# Patient Record
Sex: FEMALE | Race: WHITE | HISPANIC OR LATINO | Employment: FULL TIME | URBAN - METROPOLITAN AREA
[De-identification: names, ages, dates, MRNs, and addresses within clinical notes are randomized per-mention and may not be internally consistent; named-entity substitution may affect disease eponyms.]

---

## 2021-10-25 ENCOUNTER — TELEPHONE (OUTPATIENT)
Dept: NEUROLOGY | Facility: CLINIC | Age: 26
End: 2021-10-25

## 2022-02-22 ENCOUNTER — TELEPHONE (OUTPATIENT)
Dept: NEUROLOGY | Facility: CLINIC | Age: 27
End: 2022-02-22

## 2022-02-22 NOTE — TELEPHONE ENCOUNTER
Kane Lugo called to confirm the appointment date and time and the address  And she provided an email to sign up for [facility] [place] [place]

## 2022-02-24 ENCOUNTER — TELEPHONE (OUTPATIENT)
Dept: NEUROLOGY | Facility: CLINIC | Age: 27
End: 2022-02-24

## 2022-02-24 NOTE — TELEPHONE ENCOUNTER
THE [facility] to confirm patient's 3/1/2022 @   10 AM appointment and to provide address for new office location of [address] [place] [address] [place],[address], [place]

## 2022-02-28 ENCOUNTER — TELEPHONE (OUTPATIENT)
Dept: NEUROLOGY | Facility: CLINIC | Age: 27
End: 2022-02-28

## 2022-03-01 ENCOUNTER — TELEPHONE (OUTPATIENT)
Dept: NEUROLOGY | Facility: CLINIC | Age: 27
End: 2022-03-01

## 2022-03-01 ENCOUNTER — OFFICE VISIT (OUTPATIENT)
Dept: NEUROLOGY | Facility: CLINIC | Age: 27
End: 2022-03-01
Payer: COMMERCIAL

## 2022-03-01 VITALS
TEMPERATURE: 98.5 F | HEART RATE: 94 BPM | WEIGHT: 163 LBS | DIASTOLIC BLOOD PRESSURE: 84 MMHG | BODY MASS INDEX: 32 KG/M2 | SYSTOLIC BLOOD PRESSURE: 114 MMHG | HEIGHT: 60 IN

## 2022-03-01 DIAGNOSIS — G37.9 DEMYELINATING DISEASE (HCC): Primary | ICD-10-CM

## 2022-03-01 PROCEDURE — 99205 OFFICE O/P NEW HI 60 MIN: CPT | Performed by: PSYCHIATRY & NEUROLOGY

## 2022-03-01 RX ORDER — MONTELUKAST SODIUM 10 MG/1
TABLET ORAL
COMMUNITY
Start: 2022-02-24

## 2022-03-01 RX ORDER — NORGESTIMATE AND ETHINYL ESTRADIOL 0.25-0.035
1 KIT ORAL DAILY
COMMUNITY
Start: 2021-09-20

## 2022-03-01 RX ORDER — IPRATROPIUM BROMIDE 21 UG/1
2 SPRAY, METERED NASAL
COMMUNITY
Start: 2021-11-10 | End: 2022-11-10

## 2022-03-01 RX ORDER — ALBUTEROL SULFATE 2.5 MG/3ML
SOLUTION RESPIRATORY (INHALATION)
COMMUNITY
Start: 2021-11-23

## 2022-03-01 RX ORDER — IBUPROFEN 600 MG/1
600 TABLET ORAL EVERY 6 HOURS PRN
COMMUNITY
Start: 2022-01-29

## 2022-03-01 RX ORDER — CARISOPRODOL 350 MG/1
350 TABLET ORAL AS NEEDED
COMMUNITY
Start: 2021-11-07

## 2022-03-01 RX ORDER — ESCITALOPRAM OXALATE 10 MG/1
10 TABLET ORAL DAILY
COMMUNITY
Start: 2022-02-18

## 2022-03-01 RX ORDER — SUMATRIPTAN 100 MG/1
100 TABLET, FILM COATED ORAL AS NEEDED
COMMUNITY
Start: 2021-12-31

## 2022-03-01 RX ORDER — AMITRIPTYLINE HYDROCHLORIDE 50 MG/1
TABLET, FILM COATED ORAL
COMMUNITY
Start: 2022-02-20

## 2022-03-01 RX ORDER — MECLIZINE HCL 12.5 MG/1
12.5 TABLET ORAL AS NEEDED
COMMUNITY
Start: 2022-02-18

## 2022-03-01 NOTE — PROGRESS NOTES
Patient ID: Batsheva Fitzpatrick is a 32 y o  female  Assessment/Plan:    Demyel[facility] disease ([facility] )  This is a 30yo w/ a PMH of migraines and episodic vertigo coming in for initial evaluation for possible demyelinating disease after an abnormal MRI in 10/2021 at [facility]  She follows w/ Dr Anita Simpson at [facility] for her migraines and his last note stated possible suspicion of demyelinating disease  Last dilated eye exam was 11/2021 and was noted to have some astigmatism and uses glasses  She previously followed w/ [facility] and there was possible concern for MS at that time but could not definitively state she had a diagnosis of MS around the age of 15-14yo  At that time, she was initially hospitalized for blurry vision that occurred during a migraine (but her vision is fine when not having migraine)  LP was also done that was negative  Evoked potential testing was done at that time that did not show evidence that supported MS at that time  Work-up:  - previous Lyme AB per patient in 2018 was negative  - 10/16/21 temple MRI: Stable nonspecific deep right frontal, pericallosal, and  periventricular white matter signal abnormality  Differential  considerations include sequela of demyelinating disease, vascular disease, autoimmune disease, sarcoid, post viral encephalomyelitis,  and Lyme disease         Plan   - labs ordered for demyelinating disease: Lyme, sjogren's AB, sarcoid (ACE level), BUN, creatinine level  - C-spine and T-spine with and w/o contrast MRI ordered to rule out any new cord lesions   - expect to get MRI brain w/ and w/o contrast in 6-12 months since last MRI brain was done 10/15/2021  - f/u in 4 months or earlier if worsening symptoms          Problem List Items Addressed This Visit        Nervous and Auditory    Demyelinating disease ([facility] ) - Primary     This is a 30yo w/ a PMH of migraines and episodic vertigo coming in for initial evaluation for possible demyelinating disease after an abnormal MRI in 10/2021 at [facility]  She follows w/ Dr Gabriel Li at [place] for her migraines and his last note stated possible suspicion of demyelinating disease  Last dilated eye exam was 11/2021 and was noted to have some astigmatism and uses glasses  She previously followed w/ [facility] and there was possible concern for MS at that time but could not definitively state she had a diagnosis of MS around the age of 15-12yo  At that time, she was initially hospitalized for blurry vision that occurred during a migraine (but her vision is fine when not having migraine)  LP was also done that was negative  Evoked potential testing was done at that time that did not show evidence that supported MS at that time  Work-up:  - previous Lyme AB per patient in 2018 was negative  - 10/16/21 temple MRI: Stable nonspecific deep right frontal, pericallosal, and  periventricular white matter signal abnormality  Differential  considerations include sequela of demyelinating disease, vascular disease, autoimmune disease, sarcoid, post viral encephalomyelitis,  and Lyme disease  Plan   - labs ordered for demyelinating disease: Lyme, sjogren's AB, sarcoid (ACE level), BUN, creatinine level  - C-spine and T-spine with and w/o contrast MRI ordered to rule out any new cord lesions   - expect to get MRI brain w/ and w/o contrast in 6-12 months since last MRI brain was done 10/15/2021  - f/u in 4 months or earlier if worsening symptoms                   Subjective:    HPI      This is a 32yo w/ a PMH of migraines and episodic vertigo coming in for initial evaluation for possible demyelinating disease after an abnormal MRI in 10/2021 at [facility]  Headaches occured in high school/early college and improved but came back in 07/2020  cervical paraspinal area radiating to neck w/ tightness   Migraines triptan and amitriptyline  will do demyelinating dz w/u    In 2006 (13yo F), when she got MRIs and was referred to neurologist to try and rule-out MS  Was told that "everyone has MS that is dormant " She had a spinal tap, and eye exams  She was referred by Yancy Diallo is [facility] patient  She had seen a neurologist in 2013 and was told it was severe migraine  No history of head trauma; There is a family history of migraines, alzheimers, no seizures, aneurysm, stroke ; She has time periods where the migraines are really bad  For a couple of months, she has the same migraine 3-4 times per week; It's severity seems to be intermittent and triggers include are stress induced  She follows w/ Dr Hafsa Gregory for migraines and was told about white matter disease as possible consideration  The imitrex and amitripyline has helped  She also has episodic vertigo and average lasts around 1-2 weeks  She stated that it is worsened when sitting still but can get bad with movement  Meclizine helped w/ vertigo  She comes into visit to hopefully rule out MS for some reassurance  Any recent falls, trips, bowel/bladder, vision changes or eye pain, no abnormal changes in hot water/unthoff phenomenon, no noted lhermitte signs in the past        10/16/21 temple MRI: Stable nonspecific deep right frontal, pericallosal, and periventricular white matter signal abnormality  Differential considerations include sequela of demyelinating disease, vascular disease, autoimmune disease, sarcoid, post viral encephalomyelitis, and Lyme disease  The following portions of the patient's history were reviewed and updated as appropriate:   She  has no past medical history on file  She   Patient Active Problem List    Diagnosis Date Noted    [facility] 03/01/2022     She  has no past surgical history on file  Her family history is not on file  She  reports that she has never smoked  She has never used smokeless tobacco  She reports current alcohol use   She reports that she does not use drugs   Current Outpatient Medications   Medication Sig Dispense Refill    albuterol (2 5 mg/3 mL) 0 083 % nebulizer solution INHALE 3ML 3 TIMES DAILY BY NEBULIZATION ROUTE AS NEEDED      amitriptyline (ELAVIL) 50 mg tablet       carisoprodol (SOMA) 350 mg tablet Take 350 mg by mouth as needed      escitalopram (LEXAPRO) 10 mg tablet Take 10 mg by mouth daily      ibuprofen (MOTRIN) 600 mg tablet Take 600 mg by mouth every 6 (six) hours as needed      ipratropium (ATROVENT) 0 03 % nasal spray 2 sprays into each nostril      meclizine (ANTIVERT) 12 5 MG tablet Take 12 5 mg by mouth as needed      montelukast (SINGULAIR) 10 mg tablet       norgestimate-ethinyl estradiol (ORTHO-CYCLEN) 0 25-35 MG-MCG per tablet Take 1 tablet by mouth daily      SUMAtriptan (IMITREX) 100 mg tablet Take 100 mg by mouth as needed       No current facility-administered medications for this visit  Current Outpatient Medications on File Prior to Visit   Medication Sig    albuterol (2 5 mg/3 mL) 0 083 % nebulizer solution INHALE 3ML 3 TIMES DAILY BY NEBULIZATION ROUTE AS NEEDED    amitriptyline (ELAVIL) 50 mg tablet     carisoprodol (SOMA) 350 mg tablet Take 350 mg by mouth as needed    escitalopram (LEXAPRO) 10 mg tablet Take 10 mg by mouth daily    ibuprofen (MOTRIN) 600 mg tablet Take 600 mg by mouth every 6 (six) hours as needed    ipratropium (ATROVENT) 0 03 % nasal spray 2 sprays into each nostril    meclizine (ANTIVERT) 12 5 MG tablet Take 12 5 mg by mouth as needed    montelukast (SINGULAIR) 10 mg tablet     norgestimate-ethinyl estradiol (ORTHO-CYCLEN) 0 25-35 MG-MCG per tablet Take 1 tablet by mouth daily    SUMAtriptan (IMITREX) 100 mg tablet Take 100 mg by mouth as needed     No current facility-administered medications on file prior to visit  She is allergic to apple fruit extract - food allergy, peanut (diagnostic) - food allergy, and sulfamethoxazole-trimethoprim            Objective:    Blood pressure 114/84, pulse 94, temperature 98 5 °F (36 9 °C), height 5' (1 524 m), weight 73 9 kg (163 lb)  Physical Exam  Vitals and nursing note reviewed  HENT:      Head: Normocephalic  Nose: Nose normal       Mouth/Throat:      Mouth: Mucous membranes are moist    Eyes:      General: Lids are normal       Extraocular Movements: Extraocular movements intact  Conjunctiva/sclera: Conjunctivae normal       Pupils: Pupils are equal, round, and reactive to light  Cardiovascular:      Rate and Rhythm: Normal rate and regular rhythm  Pulses: Normal pulses  Heart sounds: No murmur heard  Pulmonary:      Effort: Pulmonary effort is normal       Breath sounds: Normal breath sounds  Abdominal:      General: Abdomen is flat  Bowel sounds are normal  There is no distension  Palpations: Abdomen is soft  Tenderness: There is no abdominal tenderness  Musculoskeletal:      Cervical back: Normal range of motion and neck supple  No rigidity or tenderness  Skin:     General: Skin is warm  Neurological:      Mental Status: She is oriented to person, place, and time  Deep Tendon Reflexes:      Reflex Scores:       Tricep reflexes are 2+ on the right side and 2+ on the left side  Bicep reflexes are 2+ on the right side and 2+ on the left side  Brachioradialis reflexes are 2+ on the right side and 2+ on the left side  Patellar reflexes are 2+ on the right side and 2+ on the left side  Achilles reflexes are 2+ on the right side and 2+ on the left side  Neurological Exam  Mental Status  Awake, alert and oriented to person, place and time  Cranial Nerves  CN II: Visual acuity is normal  Visual fields full to confrontation  CN III, IV, VI: Extraocular movements intact bilaterally  Normal lids and orbits bilaterally  Pupils equal round and reactive to light bilaterally  CN V: Facial sensation is normal   CN VII: Full and symmetric facial movement    CN VIII: Hearing is normal   CN IX, X: Palate elevates symmetrically  Normal gag reflex  CN XI: Shoulder shrug strength is normal   CN XII: Tongue midline without atrophy or fasciculations  Motor  Normal muscle bulk throughout  Sensory  Sensation is intact to light touch, pinprick, vibration and proprioception in all four extremities  Reflexes                                           Right                      Left  Brachioradialis                    2+                         2+  Biceps                                 2+                         2+  Triceps                                2+                         2+  Patellar                                2+                         2+  Achilles                                2+                         2+  Plantar                           Downgoing                Downgoing    Right pathological reflexes: Viola's absent  Ankle clonus absent  Left pathological reflexes: Viola's absent  Ankle clonus absent  No noted brisk reflexes that was previously appreciated on past exams   Coordination  Right: Finger-to-nose normal  Rapid alternating movement normal   Left: Finger-to-nose normal  Heel-to-shin normal     Gait  Casual gait is normal including stance, stride, and arm swing  ROS:    Review of Systems   Constitutional: Positive for appetite change  Negative for fever  HENT: Negative  Negative for hearing loss, tinnitus, trouble swallowing and voice change  Eyes: Positive for photophobia  Negative for pain  Respiratory: Negative  Negative for shortness of breath  Cardiovascular: Negative  Negative for palpitations  Gastrointestinal: Negative  Negative for nausea and vomiting  Endocrine: Negative  Negative for cold intolerance  Genitourinary: Negative  Negative for dysuria, frequency and urgency  Musculoskeletal: Positive for back pain  Negative for myalgias and neck pain  Skin: Negative  Negative for rash  Allergic/Immunologic: Negative  Neurological: Positive for light-headedness and headaches  Negative for dizziness, tremors, seizures, syncope, facial asymmetry, speech difficulty, weakness and numbness  Migraines every week, 3-4 a week usual 1-10 pain 7-8  Last migraine end of January   Hematological: Negative  Does not bruise/bleed easily  Psychiatric/Behavioral: Positive for sleep disturbance  Negative for confusion and hallucinations  All other systems reviewed and are negative

## 2022-03-01 NOTE — ASSESSMENT & PLAN NOTE
This is a 32yo w/ a PMH of migraines and episodic vertigo coming in for initial evaluation for possible demyelinating disease after an abnormal MRI in 10/2021 at [facility]  She follows w/ Dr Jamshid Aguirre at [facility] for her migraines and his last note stated possible suspicion of demyelinating disease  Last dilated eye exam was 11/2021 and was noted to have some astigmatism and uses glasses  She previously followed w/ [facility] and there was possible concern for MS at that time but could not definitively state she had a diagnosis of MS around the age of 15-16yo  At that time, she was initially hospitalized for blurry vision that occurred during a migraine (but her vision is fine when not having migraine)  LP was also done that was negative  Evoked potential testing was done at that time that did not show evidence that supported MS at that time  Work-up:  - previous Lyme AB per patient in 2018 was negative  - 10/16/21 temple MRI: Stable nonspecific deep right frontal, pericallosal, and  periventricular white matter signal abnormality  Differential  considerations include sequela of demyelinating disease, vascular disease, autoimmune disease, sarcoid, post viral encephalomyelitis,  and Lyme disease         Plan   - labs ordered for demyelinating disease: Lyme, sjogren's AB, sarcoid (ACE level), BUN, creatinine level  - C-spine and T-spine with and w/o contrast MRI ordered to rule out any new cord lesions   - expect to get MRI brain w/ and w/o contrast in 6-12 months since last MRI brain was done 10/15/2021  - f/u in 4 months or earlier if worsening symptoms

## 2022-03-02 ENCOUNTER — TELEPHONE (OUTPATIENT)
Dept: NEUROLOGY | Facility: CLINIC | Age: 27
End: 2022-03-02

## 2022-03-02 DIAGNOSIS — F40.240 CLAUSTROPHOBIA: Primary | ICD-10-CM

## 2022-03-02 NOTE — TELEPHONE ENCOUNTER
Pt left voicemail noting MRI of c-spine and t-spine were ordered  States she lives in [place] and will have MRIs completed there  Asking if it okay for her to have open MRI completed or if closed MRI is required  Please advise       [phone]

## 2022-03-03 NOTE — TELEPHONE ENCOUNTER
Paige Aden, please let pt know ok for her to do open facility if she needs to do because of claustrophobia  I believe her prior ones were closed studies  I think the last one was done at [place]  Ideally trying to keep studies at same facility is best or at least bringing the prior disc to new facility for download to compare to new study at test site

## 2022-03-03 NOTE — TELEPHONE ENCOUNTER
Pt's mother Antonieta TicketForEventgita (on communication consent) made aware  She will discuss with patient

## 2022-03-30 ENCOUNTER — TELEPHONE (OUTPATIENT)
Dept: NEUROLOGY | Facility: CLINIC | Age: 27
End: 2022-03-30

## 2022-03-30 RX ORDER — LORAZEPAM 0.5 MG/1
TABLET ORAL
Qty: 2 TABLET | Refills: 0 | Status: SHIPPED | OUTPATIENT
Start: 2022-03-30

## 2022-03-30 NOTE — TELEPHONE ENCOUNTER
Pt's mother Remedios Bland (on communication consent) reports they were unable to schedule f/u appt in July as schedule was not yet available  She then reports she was made aware of [facility] neurology offices closer to their home (they live [place])  She would prefer follow up appts to be scheduled at [facility] or [facility] offices  Pt last seen at [facility] office with residency clinic   Okay to switch pt to appt with Dr Dexter at [facility] or [facility] per family request?

## 2022-03-30 NOTE — TELEPHONE ENCOUNTER
Happy to send lorazepam in for pre-med  Unfortunately, EMR is having issues today with e-prescribing controlled substances  Have had several issues today  I get the message:  "unable to digitally sign the medication orders  The prescription will not be e-prescribed"    I can pend the script and try sending again tomorrow    MRIs are not until 4/6

## 2022-04-04 NOTE — TELEPHONE ENCOUNTER
Patient's mother Nate Medellin called regarding authorization for MRI T/C spine  Patient having these completed at outside facility in [facility]  Scheduled for 4/6/22  Pre-cert - sent urgent referral msg as well

## 2022-04-04 NOTE — TELEPHONE ENCOUNTER
This was a 2nd opinion, I believe  Pt is also followed at [facility]  If pt is looking to stay within [facility] system, ok for pt to be seen at [facility] location or also at [employer] with tristen burden as well  Either location is ok for follow up  [employer] will help if it is the [place] location as she will know once the schedule open  Rosea Cover is great option at [facility] office since we work so closely there and both of our schedules are in place there

## 2022-04-04 NOTE — TELEPHONE ENCOUNTER
Spoke w/patient's mother Jose Arboleda  Advised her of note below  Dr Lance Meyer - no available appts with you  through July in either [facility] or [place]  Appointment schedule not yet open for [facility] for July  Any ability to fit patient into schedule for [place] or [place]? If not, mother states they will go to [facility]

## 2022-04-05 NOTE — TELEPHONE ENCOUNTER
[facility])  [address], [address] [place] [address] Drive  [phone] (contact is [place])    [place] # [id number]  Tax ID # [id number]

## 2022-04-06 NOTE — TELEPHONE ENCOUNTER
Reviewed Dr Dexter's message with pt's mother  She reports pt would only like to see Dr Casandra De La Torre at this time  They are agreeable to going back to the [facility] office  Advised that ordered labs do not require fasting

## 2022-04-25 NOTE — TELEPHONE ENCOUNTER
It appears schedule is still not open  Parisa, are you able to assist in scheduling pt for f/u once schedule is available at the [place] office? Thanks!

## 2022-05-03 ENCOUNTER — TELEPHONE (OUTPATIENT)
Dept: NEUROLOGY | Facility: CLINIC | Age: 27
End: 2022-05-03

## 2022-05-03 NOTE — TELEPHONE ENCOUNTER
Called to offer CXL appt at 10am in [facility], not enough time to get to appt, keep on the wait list

## 2022-05-04 ENCOUNTER — TELEPHONE (OUTPATIENT)
Dept: NEUROLOGY | Facility: CLINIC | Age: 27
End: 2022-05-04

## 2022-05-04 NOTE — TELEPHONE ENCOUNTER
Called and scheduled patient thru [facility] for 4 mo f/u in [place] (Closer office) 8/3/22 @ 10:30

## 2022-07-15 ENCOUNTER — TELEPHONE (OUTPATIENT)
Dept: NEUROLOGY | Facility: CLINIC | Age: 27
End: 2022-07-15

## 2022-07-15 NOTE — TELEPHONE ENCOUNTER
Requesting labs be faxed to patient's workplace in order to have drawn outside [address]; she misplaced scripts    Fax [phone]    Faxed same as requested

## 2022-07-22 ENCOUNTER — TELEPHONE (OUTPATIENT)
Dept: NEUROLOGY | Facility: CLINIC | Age: 27
End: 2022-07-22

## 2022-07-22 NOTE — TELEPHONE ENCOUNTER
Called to remind patient of outstanding labs, Mother [facility] (Patient contact) said they will get them done and she was sending MRI discs for recent MRI's   Confirmed appt

## 2022-07-28 LAB
ACE SERPL-CCNC: 32 U/L (ref 14–82)
APCR PPP: 1.8 RATIO
AT III ACT/NOR PPP CHRO: 90 %
B BURGDOR IGG+IGM SER QL IA: NEGATIVE
BUN SERPL-MCNC: 13 MG/DL (ref 6–20)
BUN/CREAT SERPL: 17 (ref 9–23)
CREAT SERPL-MCNC: 0.78 MG/DL (ref 0.57–1)
EGFR: 105 ML/MIN/1.73
ENA SS-A AB SER-ACNC: <0.2 AI (ref 0–0.9)
ENA SS-B AB SER-ACNC: 0.2 AI (ref 0–0.9)
LPA SERPL-SCNC: 7 MG/DL
PAI1 AG PPP IA-ACNC: <4.4 IU/ML
PROT C ACT/NOR PPP CHRO: 136 %
PROT S ACT/NOR PPP: 68 %
PROTHROM ACT/NOR PPP: 128 %

## 2022-07-29 ENCOUNTER — TELEPHONE (OUTPATIENT)
Dept: NEUROLOGY | Facility: CLINIC | Age: 27
End: 2022-07-29

## 2022-07-29 NOTE — TELEPHONE ENCOUNTER
Spoke /wpatient's mother Margoth Garces (on consent form)  Advised her of results and recommendations below  Mother verbalized understanding  Patient will f/u w/PCP  She states daughter does not have a hx of DVT or miscarriage to her knowledge  Called PCP [facility] Confirmed fax #  Lab results faxed to PCP @ [phone]    Confirmed 8/3/22 f/u appt w/Dr Yvette Bonner

## 2022-07-29 NOTE — TELEPHONE ENCOUNTER
----- Message from Bentley Jaffe MD sent at 7/29/2022 10:33 AM EDT -----  Let pt know one of the thromobis panel labs abn ie activated prot c resistance  Please send copy of labs to pcp to see if heme consult recommended  Any history of dvt or miscarriages? Pcp not in epic  Please send task to pcp to follow up on prot c resistance labs abn

## 2022-08-02 ENCOUNTER — TELEPHONE (OUTPATIENT)
Dept: NEUROLOGY | Facility: CLINIC | Age: 27
End: 2022-08-02

## 2022-08-02 NOTE — TELEPHONE ENCOUNTER
Received MRI Disc via mail for Dr Hollis Dial  Will hold until Monday 8/8/22 and give it to her MA then, when they're back in the [facility] office

## 2022-08-03 ENCOUNTER — OFFICE VISIT (OUTPATIENT)
Dept: NEUROLOGY | Facility: CLINIC | Age: 27
End: 2022-08-03
Payer: COMMERCIAL

## 2022-08-03 VITALS
SYSTOLIC BLOOD PRESSURE: 108 MMHG | TEMPERATURE: 97.6 F | BODY MASS INDEX: 32 KG/M2 | HEIGHT: 60 IN | WEIGHT: 163 LBS | DIASTOLIC BLOOD PRESSURE: 84 MMHG | HEART RATE: 83 BPM

## 2022-08-03 DIAGNOSIS — R93.0 ABNORMAL MRI OF HEAD: ICD-10-CM

## 2022-08-03 DIAGNOSIS — R42 VERTIGO: ICD-10-CM

## 2022-08-03 DIAGNOSIS — R51.9 HEADACHE: Primary | ICD-10-CM

## 2022-08-03 PROCEDURE — 99214 OFFICE O/P EST MOD 30 MIN: CPT | Performed by: PSYCHIATRY & NEUROLOGY

## 2022-08-03 NOTE — PROGRESS NOTES
Patient ID: Carlos Mann is a 32 y o  female  Assessment/Plan:    Abnormal MRI of head  Pt seen today for neuro follow up  Pt last seen in 3/1/22  Pt notes no new sxs  No recent infections  No recent hospitalizations  Exam normal  Pt notes no ocular pathology in past or at last optho eval  Re reviewed oct 2021 head imaging  Also rev most updated mr c and t spine ordered by me at last appt  Normal cervical and thoracic cord  Rev with pt still rec being under ms surveillance due to location of wm changes on mri head dating back to initial films  Pt seen by 3 neurologists to date- no dx of MS to date  Will cont to follow clinical and radiographically  rec updated mri head due to occ right perioral paresthesias  Pt noted these sxs after her fall and fx of her right elbow  Pt to call me a few days after study       Diagnoses and all orders for this visit:    Headache  -     MRI brain without contrast; Future    Abnormal MRI of head  -     MRI brain without contrast; Future    Vertigo  -     MRI brain without contrast; Future           Subjective:    HPI    Pt is a 31 yo f with pmh of migraines who presented at last visit for    second/ third opinion re possible MS  Pt last seen on 3/1/22  Per my last note "Pt is a 31 yo f here with mom  Pt with history of migraines and asthma  Pt with history of headaches dating back to age 13  Rev notes from [facility], dr Juan Ni from 2016  Also rev imaging from his report and lp results from that interval of time  Pt main sxs were headaches as well as dizziness even datng back to teens  Pt still with ongoing headaches and recently seen at [facility] by Dr Oscar Stanley for headache management  Pt on elavil and imitrex  Pt notes this current combination working for her  Rev most recent mri head without contrast from oct 2021  Study done at [place] and rev in care everywhere  Per report from dr Sobia Geiger, no significant change noted  Comp to jan 2013 and oct 2006 studies   Per report stable, nonsepcific deep right frontal and pericallosal and pv wm signal abn  Rev with pt in detail most current study as well as comparision to mri head from 2006 notes from [facility]  Per note, oct 12 2006, [address] shows three lesions in the wm, evident in flare in 2 imaging , which one was present in the corpus callosum resembling camilo finger  No enh with john  No mass effect  Study also rev per report with NR at [facility] as well  Most current report from oct 2021, rev study with pt again stable but mention of pericallosal and pv wm changes  Rev with pt and mom radographical significance of pericallosal location  Rev typical presentation of ms and rads features as well as typical presentation of lesions  by time and space  Pt without any specific ms sxs to date  Main reason for imaging has been migraines  However wm lesions seen at age 13  Clinically non focal exam "  Kept above paragraph due to complicated history  Pt notes since last visit no new sxs  No  trips  No change in vision  No change in bowel or bladder  No loc  No sz  No change in speech or swallowing  No vertigo  Pt notes one fall  Due to leaning over on ladder and ladder kept going  Pt did not miss step but rather leaned out too far  pt with fx of right elbow which is healing after immobilization  overall no other new sxs  Pt also had labs completed as part of work up for wm changes  No personal history of clot or dvt  Pt does have older sister with prior dvt and unsure about her dads history  Pt labs ok except for  abn  activated prot c resistance   Labs were sent to pcp to see if heme consult recommended   pt has an appt already scheduled with hematology for next week to review lab and family history  Mri c and t spine also completed for fulll demylinating axis eval and both mri c and t spine from [facility] normal cord  Rev with pt and mom    Rev main reason to continue demylinating surviellance is due to age and imaging location of callosal involvement  Pt is in agreement  Pt did not a few episodes since fall on elbow of right perioral paresthesias  rec to pt to get imaging done this month for compatision mri to oct 2021  Pt gets studies done at [place]  Pt will call 2 days later  Prior lp was negative per pt  Pt aware to call for any suspicious sxs  The following portions of the patient's history were reviewed and updated as appropriate: allergies, current medications, past family history, past medical history, past social history, past surgical history and problem list and ros and med rec rev         Objective:    Blood pressure 108/84, pulse 83, temperature 97 6 °F (36 4 °C), height 5' (1 524 m), weight 73 9 kg (163 lb)  Physical Exam  Constitutional:       General: She is not in acute distress  Appearance: She is not ill-appearing  Eyes:      General: Lids are normal       Extraocular Movements: Extraocular movements intact  Pupils: Pupils are equal, round, and reactive to light  Musculoskeletal:      Right lower leg: No edema  Left lower leg: No edema  Neurological:      Mental Status: She is alert  Deep Tendon Reflexes: Strength normal and reflexes are normal and symmetric  Psychiatric:         Speech: Speech normal          Neurological Exam  Mental Status  Alert  Recent and remote memory are intact  Able to copy figure  Speech is normal  Language is fluent with no aphasia  Attention and concentration are normal  Fund of knowledge is appropriate for level of education  Cranial Nerves  CN II: Visual acuity is normal  Visual fields full to confrontation  CN III, IV, VI: Extraocular movements intact bilaterally  Normal lids and orbits bilaterally  Pupils equal round and reactive to light bilaterally  CN V: Facial sensation is normal   CN VII: Full and symmetric facial movement  CN VIII: Hearing is normal   CN IX, X: Palate elevates symmetrically  Normal gag reflex  CN XI: Shoulder shrug strength is normal   CN XII: Tongue midline without atrophy or fasciculations  Motor  Normal muscle bulk throughout  Normal muscle tone  No abnormal involuntary movements  Strength is 5/5 throughout all four extremities  Sensory  Sensation is intact to light touch, pinprick, vibration and proprioception in all four extremities  Reflexes  Deep tendon reflexes are 2+ and symmetric in all four extremities  Coordination  Right: Finger-to-nose normal  Rapid alternating movement normal Left: Finger-to-nose normal  Rapid alternating movement normal     Gait  Casual gait is normal including stance, stride, and arm swing  ROS:    Review of Systems   Constitutional: Negative  Negative for appetite change and fever  HENT: Negative  Negative for hearing loss, tinnitus, trouble swallowing and voice change  Eyes: Negative  Negative for photophobia and pain  Respiratory: Negative  Negative for shortness of breath  Cardiovascular: Negative  Negative for palpitations  Gastrointestinal: Negative  Negative for nausea and vomiting  Endocrine: Negative  Negative for cold intolerance  Genitourinary: Negative  Negative for dysuria, frequency and urgency  Musculoskeletal: Negative  Negative for myalgias and neck pain  Skin: Negative  Negative for rash  Allergic/Immunologic: Negative  Neurological: Negative  Negative for dizziness, tremors, seizures, syncope, facial asymmetry, speech difficulty, weakness, light-headedness, numbness and headaches  Hematological: Negative  Does not bruise/bleed easily  Psychiatric/Behavioral: Negative  Negative for confusion, hallucinations and sleep disturbance  All other systems reviewed and are negative

## 2022-08-03 NOTE — ASSESSMENT & PLAN NOTE
Pt seen today for neuro follow up  Pt last seen in 3/1/22    Pt notes no new sxs  No recent infections  No recent hospitalizations  Exam normal  Pt notes no ocular pathology in past or at last optho eval  Re reviewed oct 2021 head imaging  Also rev most updated mr c and t spine ordered by me at last appt  Normal cervical and thoracic cord  Rev with pt still rec being under ms surveillance due to location of wm changes on mri head dating back to initial films  Pt seen by 3 neurologists to date- no dx of MS to date  Will cont to follow clinical and radiographically  rec updated mri head due to occ right perioral paresthesias  Pt noted these sxs after her fall and fx of her right elbow  Pt to call me a few days after study

## 2022-08-05 ENCOUNTER — TELEPHONE (OUTPATIENT)
Dept: NEUROLOGY | Facility: CLINIC | Age: 27
End: 2022-08-05

## 2022-12-15 ENCOUNTER — TELEPHONE (OUTPATIENT)
Dept: NEUROLOGY | Facility: CLINIC | Age: 27
End: 2022-12-15

## 2022-12-15 NOTE — TELEPHONE ENCOUNTER
Caller left voicemail regarding patient (unsure name of caller) requesting a call back regarding MRI       [phone]

## 2022-12-16 NOTE — TELEPHONE ENCOUNTER
shira larios from pt stating that she needs authorization for MRI that we wanted her to have done    states that we wanted her to get it in [address]

## 2022-12-22 NOTE — TELEPHONE ENCOUNTER
MRI brain ordered  Need to confirm where pt would like to have imaging completed  If within [facility], pt just needs to schedule imaging and our team will be prompted to complete PA      Called and Left a message on pt's answering machine for a call back

## 2023-01-04 NOTE — TELEPHONE ENCOUNTER
Spoke w/pt's mother Jovi Hoang  Pt unable to schedule MRI brain scan w/[facility] as they require dx and CPT code  Mother requested MRI order be emailed to pt's email address on file  MRI order emailed to: [email or web address]    Mother will call once MRI brain is scheduled to advise date so authorization process can be started  Confirmed pt's f/u appt on 2/3/23 w/Dr Jeremiah Black  MRI brain order emailed

## 2023-01-04 NOTE — TELEPHONE ENCOUNTER
Patient's mother called in asking for a call back regarding patient's MRI Brain  States she has called 6th or 7th time regarding this  Says someone has called her back but every time someone calls they can't hear her  # [phone]    VM transcribed: This is for Dr Tanner Eng  Patients name is Viktoria Marsh, date of birth [birth year]  I am calling because we are trying to obtain a order that Dr Tanner Eng wanted another MRI of the brain done  If you could please give us a call at [phone]  This is like my maybe 6th time, 7th time calling  I did when I get a call back  But every time I into the phone for some reason I answer and the person does hear me and they hang up I, I don't know what else to do  If you could please give me a call  I will appreciate it  Thank you

## 2023-01-05 NOTE — TELEPHONE ENCOUNTER
MRI referral updated with information below and marked as urgent  Spoke w/pt's mother Bubba Catherine  She advised since she left first msg stating MRI was scheduled for 1/9/23, the date had to be changed to 1/28/23  Referral updated w/new date  Pre-Cert - auth required for external MRI

## 2023-01-05 NOTE — TELEPHONE ENCOUNTER
Pt's mother, Wes Pepper, left a VM re: authorization for pt's MRI  States she was able to schedule MRI for 1/9/23 at [facility] (Unable to understand)  Requesting a call back at # [phone]  Thank you  Transcribed VM:   Good morning  This message is for Rey Baer assistant  My daughters date of birth is 2/[birth year]/93, Steve Brewer and I am calling regarding the authorization needed that needs to be done  I was able to schedule the MRI for the January 9th, at [place] [address] (?)  And I know that you needed this information so that the authorization can be worked on  If you could please give me a call, I'd really appreciate it  Thank you have a good day

## 2023-03-20 ENCOUNTER — TELEPHONE (OUTPATIENT)
Dept: NEUROLOGY | Facility: CLINIC | Age: 28
End: 2023-03-20

## 2023-03-24 ENCOUNTER — OFFICE VISIT (OUTPATIENT)
Dept: NEUROLOGY | Facility: CLINIC | Age: 28
End: 2023-03-24

## 2023-03-24 VITALS
SYSTOLIC BLOOD PRESSURE: 102 MMHG | TEMPERATURE: 98 F | BODY MASS INDEX: 29.45 KG/M2 | DIASTOLIC BLOOD PRESSURE: 70 MMHG | HEIGHT: 60 IN | WEIGHT: 150 LBS | HEART RATE: 77 BPM

## 2023-03-24 DIAGNOSIS — G47.00 FREQUENT NOCTURNAL AWAKENING: ICD-10-CM

## 2023-03-24 DIAGNOSIS — R53.83 FATIGUE: Primary | ICD-10-CM

## 2023-03-24 DIAGNOSIS — R53.83 FATIGUE: ICD-10-CM

## 2023-03-24 DIAGNOSIS — R93.0 ABNORMAL MRI OF HEAD: Primary | ICD-10-CM

## 2023-03-24 RX ORDER — FLUTICASONE PROPIONATE AND SALMETEROL 250; 50 UG/1; UG/1
POWDER RESPIRATORY (INHALATION)
COMMUNITY
Start: 2023-01-05

## 2023-03-24 NOTE — ASSESSMENT & PLAN NOTE
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- per hematology testing and phone note from 10/11/22  Also pt with abn APC resistance testing  Pt told not need meds at this time, but needs heme eval prior to pregnancy  Pt is well aware- good visit with dr Debbie Gambino at [facility] [place] [facility]  Rev note with pt  Also pt seen by gyn and changed bcp to nexplanon  Pt notes she is doing well from her menses  Pt notes on going history of migraines  Will have pt see headache team to help with ha management  Updated mri head done on 1/29/23- stable appearance of T2/flair hyperint in the right frontal subcortical and deep wm    No new or progressive areas of signal abn  Rev with pt- no change from oct 2021 or oct 2006  Cont under clinical and rads surviellance  Exam stable  Some fatigue complaints currently

## 2023-03-24 NOTE — PROGRESS NOTES
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- per hematology testing and phone note from 10/11/22  Also pt with abn APC resistance testing  Pt told not need meds at this time, but needs heme eval prior to pregnancy  Pt is well aware- good visit with dr Naty Dixon at [facility] [place] [facility]/ samia  Rev note with pt  Also pt seen by gyn and changed bcp to nexplanon  Pt notes she is doing well from her menses  Pt notes on going history of migraines  Will have pt see headache team to help with ha management  Updated mri head done on 1/29/23- stable appearance of T2/flair hyperint in the right frontal subcortical and deep wm  No new or progressive areas of signal abn  Rev with pt- no change from oct 2021 or oct 2006  Cont under clinical and rads surviellance  Exam stable  Some fatigue complaints currently    Fatigue  Pt notes increased fatigue  Unsure etiology  rec discussion with pcp as well  Last thyroid studies from 2018  Will update tsh and vit b12  Also refer to sleep med  Pt also notes significant with different sleep patterns than her own with occ awakening in middle of night and inability to fall back to sleep  Rev improved sleep hygiene as well  No significant snoring history       Diagnoses and all orders for this visit:    Abnormal MRI of head    Fatigue    Other orders  -     etonogestrel (NEXPLANON) subdermal implant; Inject under the skin  -     Fluticasone-Salmeterol (Advair) 250-50 mcg/dose inhaler; inhale 1 puff by mouth and INTO THE LUNGS twice a day  -     sertraline (ZOLOFT) 50 mg tablet;  Take 50 mg by mouth daily           Subjective:    HPI    Pt is a 24 yo f with pmh of migraines who presented at last visit for    second/ third opinion re possible MS  Pt last seen on 8/3/22  Per my last note "Pt is a 33 yo f here with mom  Pt with history of migraines and asthma   Pt with history of headaches dating back to age 13  Israel Levy notes from [de-identified] [facility], dr Debbi Wilkinson from 2016  Tomasa Berg rev imaging from his report and lp results from that interval of time   Pt main sxs were headaches as well as dizziness even datng back to teens  Pt still with ongoing headaches and recently seen at [facility] by Dr Sole Sands for headache management  Pt on elavil and imitrex    Pt notes this current combination working for her  Israel Rileyyd most recent mri head without contrast from oct 2021   Study done at [place] and rev in care everywhere   Per report from dr Linda Wilson, no significant change noted  Comp to jan 2013 and oct 2006 studies  Per report stable, nonsepcific deep right frontal and pericallosal and pv wm signal abn  Rev with pt in detail most current study as well as comparision to mri head from 2006 notes from [facility]   Per note, oct 12 2006, at [facility] shows three lesions in the wm, evident in flare in 2 imaging , which one was present in the corpus callosum resembling camilo finger   No enh with john    No mass effect   Study also rev per report with NR at [facility] as well    Most current report from oct 2021, rev study with pt again stable but mention of pericallosal and pv wm changes   Rev with pt and mom radographical significance of pericallosal location   Rev typical presentation of ms and rads features as well as typical presentation of lesions  by time and space  Pt without any specific ms sxs to date  Baldemar Funez reason for imaging has been migraines   However wm lesions seen at age 13   Clinically non focal exam "  Kept above paragraph due to complicated history  Pt notes since last visit no new sxs  pt here with mom today  No new sxs  No change in vision  No loss of vision  No history of optic neuritis  No diplopia  Pt notes int headaches  Pt notes cluster of headaches at times  Pt here due to history of abn mri head  Rev imaging in detail with pt and family  Pt just had most recent imaging updated  Pt had mri head on 1/28/23 at [facility]   Rev study in - stable appearance of the T2/flair hyperintensity in the right frontal subcortical and deep wm  No new or progressive areas of signal abn  No acute cva or hemorrhage or mass  Pituitary normal findings are stable dating back to prior study from 2006  Pt notes main new sxs at this time is fatigue  Pt feels tired all day  Pt does note some significant interruption to sleep due to going to bed around 830 pm and boyfriend coming to bed much later and this awakens her with inability to go back to sleep  Rev with pt in detail good sleep hygiene techniques and also rec to consider sleep eval for any other underlying sleep disorders  Pt denies snoring, question restless  Will also update tsh and vit b12  Last tsh several yrs ago in epic  Of note, due to family history of clotting disorders and abn imaging, I had pt complete thrombosis panel with abn APC resistance  Pt was sent to hematology  Pt seen at [facility]  Found note in care everywhere after careful review and help from pt mom re date of service and location  Per notes from dr Daren Echevarria "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 and was advised by her pcp to stop her birth control pills  I reviewed options for birth control  Ideally the ParaGard IUD would be optimal since it has no hormones  However she states she uses her birth control pills for control of heavy menses as well  Reviewed with her the next best options would be progesterone only options including IUD such as Mirena, elodia liletta etc, Nexplanon implant or depoprovera injections  These are all progesterone only products   Advise her the risk for thromboembolic events with these products are decreased compared to [address], but not completely eliminated  As such there is still a slight risk for thromboembolic events such as DVT or PE  As such she needs to consider risks vs benefits and make a decision regarding her options  She will call back once she has made decision "  Pt was then seen by gyn and oral bcp stopped and pt had nnexplanon implanted a few months ago  Pt notes her cycle is now re regulated  Pt is  Aware of need to re contact hematology in future years when preparing for a pregnancy  Pt is well aware  Pt notes no meds currently needed  Pt notes occ headaches with her menses  Pt also with family history of cad on moms side  Pt has rx for imitrex from pcp  No cp or sob with med  However due to APC history and factor VLeiden history, looking for other alternatives for headache abortives for pt  Pt to be set up with hedache team  Pt in agreement to remain under clinical and rads surveillance for demylinating disease  Due to overall rads stability over 17 years, cont with close monitoring and prn imaging  Pt aware to call for any new sxs and imaging will be done at time of sxs for best yield of testing                 Total time spent today 40 minutes  Greater than 50% of total time was spent with the patient and / or family counseling and / or coordination of care        The following portions of the patient's history were reviewed and updated as appropriate: allergies, current medications, past family history, past medical history, past social history, past surgical history and problem list and med rec and ros rev  Objective:    Blood pressure 102/70, pulse 77, temperature 98 °F (36 7 °C), height 5' (1 524 m), weight 68 kg (150 lb)  Physical Exam  Constitutional:       General: She is not in acute distress  Appearance: Normal appearance  She is not ill-appearing  Eyes:      General: Lids are normal       Extraocular Movements: Extraocular movements intact        Pupils: Pupils are equal, round, and reactive to light  Musculoskeletal:      Right lower leg: No edema  Left lower leg: No edema  Neurological:      Mental Status: She is alert  Motor: Motor strength is normal       Deep Tendon Reflexes: Reflexes are normal and symmetric  Psychiatric:         Speech: Speech normal          Neurological Exam  Mental Status  Alert  Recent and remote memory are intact  Speech is normal  Language is fluent with no aphasia  Attention and concentration are normal  Fund of knowledge is appropriate for level of education  Cranial Nerves  CN II: Visual acuity is normal  Visual fields full to confrontation  CN III, IV, VI: Extraocular movements intact bilaterally  Normal lids and orbits bilaterally  Pupils equal round and reactive to light bilaterally  CN V: Facial sensation is normal   CN VII: Full and symmetric facial movement  CN VIII: Hearing is normal   CN IX, X: Palate elevates symmetrically  Normal gag reflex  CN XI: Shoulder shrug strength is normal   CN XII: Tongue midline without atrophy or fasciculations  Motor  Normal muscle bulk throughout  Normal muscle tone  Strength is 5/5 throughout all four extremities  Sensory  Sensation is intact to light touch, pinprick, vibration and proprioception in all four extremities  Reflexes  Deep tendon reflexes are 2+ and symmetric in all four extremities  Coordination  Right: Finger-to-nose normal  Rapid alternating movement normal Left: Finger-to-nose normal  Heel-to-shin normal     Gait  Casual gait is normal including stance, stride, and arm swing  ROS:    Review of Systems   Constitutional: Negative  Negative for appetite change and fever  HENT: Negative  Negative for hearing loss, tinnitus, trouble swallowing and voice change  Eyes: Negative  Negative for photophobia, pain and visual disturbance  Respiratory: Negative  Negative for shortness of breath  Cardiovascular: Negative  Negative for palpitations  Gastrointestinal: Negative  Negative for nausea and vomiting  Endocrine: Negative  Negative for cold intolerance  Genitourinary: Negative  Negative for dysuria, frequency and urgency  Musculoskeletal: Negative  Negative for gait problem, myalgias and neck pain  Skin: Negative  Negative for rash  Allergic/Immunologic: Negative  Neurological: Positive for headaches  Negative for dizziness, tremors, seizures, syncope, facial asymmetry, speech difficulty, weakness, light-headedness and numbness  Gets a HA  Around her period as well   she takes imitrex   Hematological: Negative  Does not bruise/bleed easily  Psychiatric/Behavioral: Positive for sleep disturbance  Negative for confusion and hallucinations  Very restless in the last 4-6 months, wakes up can't go back sleep  When she does fall back to sleep its time to get up  Feels fatigue throughout the day even with caffeine  Rest is not solid sleep   All other systems reviewed and are negative

## 2023-03-24 NOTE — ASSESSMENT & PLAN NOTE
Pt notes increased fatigue  Unsure etiology  rec discussion with pcp as well  Last thyroid studies from 2018  Will update tsh and vit b12  Also refer to sleep med  Pt also notes significant with different sleep patterns than her own with occ awakening in middle of night and inability to fall back to sleep  Rev improved sleep hygiene as well  No significant snoring history

## 2023-05-03 ENCOUNTER — TELEPHONE (OUTPATIENT)
Dept: NEUROLOGY | Facility: CLINIC | Age: 28
End: 2023-05-03

## 2023-05-03 NOTE — TELEPHONE ENCOUNTER
Called and spoke to patient - confirmed upcoming appointment with Tim West on 05/08/23 10:15 am at the [facility] office  Provided patient with apt date, time and location  Informed patient that check in is at least 15 minutes prior to apt time  The patient is not  having any issues or concerns at this time

## 2023-05-08 ENCOUNTER — OFFICE VISIT (OUTPATIENT)
Dept: NEUROLOGY | Facility: CLINIC | Age: 28
End: 2023-05-08
Payer: COMMERCIAL

## 2023-05-08 VITALS
DIASTOLIC BLOOD PRESSURE: 69 MMHG | WEIGHT: 148 LBS | TEMPERATURE: 97.6 F | RESPIRATION RATE: 16 BRPM | BODY MASS INDEX: 29.06 KG/M2 | SYSTOLIC BLOOD PRESSURE: 115 MMHG | HEART RATE: 73 BPM | HEIGHT: 60 IN

## 2023-05-08 DIAGNOSIS — G43.009 MIGRAINE WITHOUT AURA AND WITHOUT STATUS MIGRAINOSUS, NOT INTRACTABLE: Primary | ICD-10-CM

## 2023-05-08 PROCEDURE — 99213 OFFICE O/P EST LOW 20 MIN: CPT | Performed by: NURSE PRACTITIONER

## 2023-05-08 RX ORDER — NARATRIPTAN 2.5 MG/1
2.5 TABLET ORAL AS NEEDED
Qty: 9 TABLET | Refills: 3 | Status: SHIPPED | OUTPATIENT
Start: 2023-05-08

## 2023-05-08 NOTE — PROGRESS NOTES
Patient ID: Allen Bonner is a 25 y o  female  Assessment/Plan:  Patient Instructions:  Start magnesium oxide 400mg/day over the counter  Start riboflavin (b2) 400mg/day over the counter  Switch from imitrex to amerge-could use 1/2 tablet twice a day for 3-5 days for the menstrual migraines  Continue with good hydration/physical exercise, log the migraines (migraine buddy mable may be helpful)  Follow up as scheduled       Diagnoses and all orders for this visit:    Migraine without aura and without status migrainosus, not intractable  -     naratriptan (AMERGE) 2 5 MG tablet; Take 1 tablet (2 5 mg total) by mouth as needed for migraine 2 5 mg at onset of headache, may repeat in 4 hours if needed         Subjective:    Kenney Burkitt is a 25year old female with past medical history of migraine, sleep difficulty, asthma, and concern for demyelinating disease in past with recent stable white matter changes on MRI (continuing clinical and radiological surveillance)  She presents today to discuss headaches, which started in 2006 (8th grade) The current headache frequency is 3x/week and usually lasts 1 day but the length of time is sporadic and can last up to 4 days  It is described as an occipital pressure that is associated with neck pain, nausea, photophobia, and occasionally vertigo; she denies vision changes  Stress and menstruation are triggers  She has tried topamax in the past (side effect numbness tingling)  She currently is on amitriptyline which has been somewhat helpful  She has not tried vitamins; she does take a daily multivitamin  She states imitrex causes side effect and doesn't always work  She states >60oz/water/day, about 24oz coffee  She exercises in the gym 3x/week  She denies family history of cerebral aneurysm; she does state family history of migraine  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 yes  Do you have any side effects? yes - tired from sumatriptan  How often do you use abortive medications to treat a headache? 2 times per week  How effective are they? somewhat effective  From 0-10, how severe is the pain for a typical headache for you? 7  What does your typical headache pain feel like? sharp and pressure  Where is your typical headache? occipital and nuchal  What is your current headache frequency: 3 times per week  How long does your typical headache last? 1 day(s), up to 4 days  In addition to the head pain, what other symptoms do you have before or during your headaches? None  Do you have anything you need to discuss with the doctor during your visit? No              The following portions of the patient's history were reviewed and updated as appropriate: allergies, current medications, past family history, past medical history, past social history, past surgical history and problem list          Objective:    Blood pressure 115/69, pulse 73, temperature 97 6 °F (36 4 °C), temperature source Temporal, resp  rate 16, height 5' (1 524 m), weight 67 1 kg (148 lb)  Physical Exam  Vitals reviewed  Constitutional:       General: She is not in acute distress  Appearance: She is normal weight  She is not ill-appearing, toxic-appearing or diaphoretic  HENT:      Head: Normocephalic and atraumatic  Right Ear: External ear normal       Left Ear: External ear normal       Nose: Nose normal       Mouth/Throat:      Mouth: Mucous membranes are moist       Pharynx: Oropharynx is clear  Eyes:      General: Lids are normal          Right eye: No discharge  Left eye: No discharge  Extraocular Movements: Extraocular movements intact        Pupils: Pupils are equal, round, and reactive to light  Cardiovascular:      Rate and Rhythm: Normal rate and regular rhythm  Pulses: Normal pulses  Heart sounds: Normal heart sounds  Pulmonary:      Effort: Pulmonary effort is normal    Abdominal:      General: There is no distension  Musculoskeletal:         General: Normal range of motion  Cervical back: Normal range of motion  No tenderness  Right lower leg: No edema  Left lower leg: No edema  Skin:     General: Skin is warm and dry  Capillary Refill: Capillary refill takes less than 2 seconds  Neurological:      General: No focal deficit present  Mental Status: She is alert  Mental status is at baseline  Motor: Motor strength is normal      Deep Tendon Reflexes: Reflexes are normal and symmetric  Psychiatric:         Mood and Affect: Mood normal          Speech: Speech normal          Behavior: Behavior normal          Neurological Exam  Mental Status  Alert  Speech is normal  Language is fluent with no aphasia  Attention and concentration are normal  Fund of knowledge is appropriate for level of education  Cranial Nerves  CN II: Visual acuity is normal  Visual fields full to confrontation  CN III, IV, VI: Extraocular movements intact bilaterally  Normal lids and orbits bilaterally  Pupils equal round and reactive to light bilaterally  CN V: Facial sensation is normal   CN VII: Full and symmetric facial movement  CN VIII: Hearing is normal   CN IX, X: Palate elevates symmetrically  Normal gag reflex  CN XI: Shoulder shrug strength is normal   CN XII: Tongue midline without atrophy or fasciculations  Motor  Normal muscle bulk throughout  Normal muscle tone  No abnormal involuntary movements  Strength is 5/5 throughout all four extremities  Sensory  Light touch is normal in upper and lower extremities  Reflexes  Deep tendon reflexes are 2+ and symmetric in all four extremities      Coordination  Right: Finger-to-nose normal Left: Finger-to-nose normal     Gait  Normal casual, toe, heel and tandem gait  ROS:    Review of Systems   Constitutional: Negative  Negative for appetite change and fever  HENT: Negative  Negative for hearing loss, tinnitus, trouble swallowing and voice change  Eyes: Negative  Negative for photophobia, pain and visual disturbance  Respiratory: Negative  Negative for shortness of breath  Cardiovascular: Negative  Negative for palpitations  Gastrointestinal: Negative  Negative for nausea and vomiting  Endocrine: Negative  Negative for cold intolerance  Genitourinary: Negative  Negative for dysuria, frequency and urgency  Musculoskeletal: Negative  Negative for gait problem, myalgias and neck pain  Skin: Negative  Negative for rash  Allergic/Immunologic: Negative  Neurological: Positive for headaches  Negative for dizziness, tremors, seizures, syncope, facial asymmetry, speech difficulty, weakness, light-headedness and numbness  Hematological: Negative  Does not bruise/bleed easily  Psychiatric/Behavioral: Negative  Negative for confusion, hallucinations and sleep disturbance     ROS was reviewed and updated as appropriate

## 2023-05-08 NOTE — PATIENT INSTRUCTIONS
Start magnesium oxide 400mg/day over the counter  Start riboflavin (b2) 400mg/day over the counter  Switch from imitrex to amerge-could use 1/2 tablet twice a day for 3-5 days for the menstrual migraines  Continue with good hydration/physical exercise, log the migraines (migraine roberto mable may be helpful)  Follow up as scheduled

## 2023-05-22 ENCOUNTER — TELEPHONE (OUTPATIENT)
Dept: NEUROLOGY | Facility: CLINIC | Age: 28
End: 2023-05-22

## 2023-05-22 NOTE — TELEPHONE ENCOUNTER
Recd vm: This is a message for Dr Guera Caldwell clinical team  My name is Aruna Sesay and date of birth  [birth year]  I was calling regarding the sleep study that Dr Biju Coleman ordered  if you could please give me a call back at [phone]  I would really appreciate it  I need to have the order of faxed over and the last doctor notes to the place where I'm going to have the speech study at is [phone]  That is the sleep study  Center [phone]  That's the fax number  Because they need the copy of the order  And then the last doctors notes office notes from the doctor about this from Doctor ervin  I would really appreciate it  Thank you  Have a good day  I   [phone]    I faxed same as requested; patient;s mother aware

## 2023-05-23 ENCOUNTER — TELEPHONE (OUTPATIENT)
Dept: NEUROLOGY | Facility: CLINIC | Age: 28
End: 2023-05-23

## 2023-05-23 NOTE — TELEPHONE ENCOUNTER
Pretty Davis from [facility] called in to get clarification on if pt needs a sleep study or a consultation with the sleep center  Please call back as pt is unsure also  Thank you  C/b is [phone]  Thank you

## 2023-07-24 ENCOUNTER — TELEPHONE (OUTPATIENT)
Dept: NEUROLOGY | Facility: CLINIC | Age: 28
End: 2023-07-24

## 2023-07-24 DIAGNOSIS — G43.019 INTRACTABLE MIGRAINE WITHOUT AURA AND WITHOUT STATUS MIGRAINOSUS: Primary | ICD-10-CM

## 2023-07-24 NOTE — TELEPHONE ENCOUNTER
Patient mother called regarding medication for migraine and a preventative medication. The migraine meds is working but the Amtripyline does not. It comes back after couple of hours. For the past two weeks she had migraines for 5 days. Also she is following the diet plan Gerard recommended and dieting but its not helping.

## 2023-07-25 RX ORDER — DIVALPROEX SODIUM 250 MG/1
TABLET, EXTENDED RELEASE ORAL
Qty: 8 TABLET | Refills: 0 | Status: SHIPPED | OUTPATIENT
Start: 2023-07-25

## 2023-07-25 NOTE — TELEPHONE ENCOUNTER
My name is Juhi Hernandez, date of birth, [birth year]. This message is for Dr. Dottie Betancourt and we were calling because we wanted to see if the preventative headache medication can be changed or I don't know increase. We're not sure what could be done. Uh its um, she's been having a headache for the past 2 weeks now. And last week it was, I would say 5 days after the 7 days that you had a headache. If you please give us a call, would really appreciate it. It's [phone]. And she's currently taking amitriptyline for the preventative. Thank you.

## 2023-07-25 NOTE — TELEPHONE ENCOUNTER
Called and spoke with patient's Mom, Blossom Chen ( on communication consent)   Patient has had Migraine / headache cycle for 2 weeks now. Feels like squeezing in the back of her head/ neck area. Patient with Nausea/ dizziness. Patient has been waking up with Migraine every morning. Current meds:  Amitriptyline 50 mg at HS ( said it is not working anymore for her)    Naratriptan 2.5 mg tabs prn ( works well and takes Migraine away but comes back after a few hours)    OTC motrin    Patient has used Steroids before to break cycle written by PCP and worked well for her. Has never used Depakote or Olanzapine. Please send any new scripts to [facility].     CB# Blossom Gustavo [phone]

## 2023-09-11 ENCOUNTER — TELEPHONE (OUTPATIENT)
Dept: NEUROLOGY | Facility: CLINIC | Age: 28
End: 2023-09-11

## 2023-09-11 DIAGNOSIS — G43.009 MIGRAINE WITHOUT AURA AND WITHOUT STATUS MIGRAINOSUS, NOT INTRACTABLE: Primary | ICD-10-CM

## 2023-09-11 NOTE — TELEPHONE ENCOUNTER
received vm from 9:51am-Good morning, this is Bindu Edward, date of birth, [birth year] and its k I L L I A N. And this message is for Dr. Angelita Leija. We are trying to see, I'm trying to see if he can please increase the amitriptyline milligrams or change the medication, it's for the preventative of migraines because I'm getting them again. And I had that on Thursday one, wednesday, all weekend, all the way from [facility]. I've had a, I had a migraine and I'm taking the medication. But the preventative doesn't seem like to be working anymore. If you please, just give me a call. I really appreciate it. Thank you. Have a good day. [phone]  ---------------------------------------  Called and spoke to pt's mom. States that pt is still getting migraines. Had a migraine since thursday. She takes naritriptan and this helps but then comes back the next day. Currently taking amitriptyline 50mg at hs    Steroids were helpful in the past  depakote helped in the fast  Never tried olanzapine    She did see sleep specialist through [facility] months ago. They were to send us notes. I do not see that we received any noted from them. She will call them to ask them to fax us the notes. Gave her our fax number.     States that pt had a home sleep study- and in hospital sleep study was recommended but she has scheduled this as she States that pt is afraid of hospitals    Please advised  On current migraine and if she can increase dose of amitriptyline  [phone] to leave detailed messag

## 2023-09-12 RX ORDER — AMITRIPTYLINE HYDROCHLORIDE 75 MG/1
75 TABLET ORAL
Qty: 90 TABLET | Refills: 1 | Status: SHIPPED | OUTPATIENT
Start: 2023-09-12 | End: 2024-03-10

## 2023-10-23 ENCOUNTER — OFFICE VISIT (OUTPATIENT)
Dept: NEUROLOGY | Facility: CLINIC | Age: 28
End: 2023-10-23
Payer: COMMERCIAL

## 2023-10-23 VITALS
DIASTOLIC BLOOD PRESSURE: 76 MMHG | HEART RATE: 85 BPM | WEIGHT: 147 LBS | TEMPERATURE: 97.5 F | BODY MASS INDEX: 28.86 KG/M2 | SYSTOLIC BLOOD PRESSURE: 120 MMHG | HEIGHT: 60 IN | OXYGEN SATURATION: 98 % | RESPIRATION RATE: 18 BRPM

## 2023-10-23 DIAGNOSIS — G43.709 CHRONIC MIGRAINE WITHOUT AURA WITHOUT STATUS MIGRAINOSUS, NOT INTRACTABLE: ICD-10-CM

## 2023-10-23 DIAGNOSIS — R93.0 ABNORMAL MRI OF HEAD: Primary | ICD-10-CM

## 2023-10-23 PROBLEM — G37.9 DEMYELINATING DISEASE (HCC): Status: RESOLVED | Noted: 2022-03-01 | Resolved: 2023-10-23

## 2023-10-23 PROCEDURE — 99214 OFFICE O/P EST MOD 30 MIN: CPT | Performed by: PHYSICIAN ASSISTANT

## 2023-10-23 RX ORDER — AMITRIPTYLINE HYDROCHLORIDE 100 MG/1
100 TABLET ORAL
Qty: 30 TABLET | Refills: 5 | Status: SHIPPED | OUTPATIENT
Start: 2023-10-23

## 2023-10-23 NOTE — PROGRESS NOTES
Patient ID: Bindu Edward is a 25 y.o. female. Assessment/Plan:    Abnormal MRI of head  Patient has been evaluated by several neurologists since early teen years for migraines and found to have abnormal MRI brain with white matter changes. Prior LP and VEP did not support MS diagnosis. Her imaging has been stable over many years. No lesions in the spinal cord. Aside from her migraines, no other neurologic issues, and no neurologic deficits lasting >24hrs. Most recent MRI brain 1/28/23 (completed at [facility]). Stable appearance of the T2/FLAIR hyperintensity in the right frontal subcortical and deep white matter. No new or progressive areas of signal abnormality. (comparisons made to 10/15/2021, 10/12/2006). MRI c-spine and t-spine 4/6/2022 (completed at [facility]) with normal cord signal.    Neurologic exam is unremarkable. Reviewed stable imaging for >15 years at this point. Discussed low suspicion for MS based on no progression of nonspecific white matter changes over all this time. Will keep under clinical and rads surveillance. Chronic migraine without aura without status migrainosus, not intractable  Patient had been referred to one of our headache team APs for evaluation of migraines. She is currently on amitriptyline 75mg HS for migraine prevention, just increased from 50mg HS last month due to increase in frequency of migraines. She is still having more frequent migraines. She did start mag and B2 supplements as suggested. Her abortive was changed from sumatriptan to naratriptan, and feels it works better. She has significant issues with sleep, likely playing a role in headaches. She was referred to sleep medicine (outside facility), had a home sleep study which showed potentially borderline PATEL, but an in lab study was recommended to better evaluate.   Patient reports she has too much anxiety to do an in lab study (which she reports is done in the hospital at the facility she goes to). They also discussed some behavioral strategies for her insomnia. She has not been back to see sleep medicine and I encouraged her returning to discuss her ongoing sleep issues. We discussed increasing amitriptyline to 100 mg at bedtime. If this does not work for preventative therapy, we could certainly consider CGRP inhibitors such as Emgdennise, Cristiane Ortez etc.  She previously tried and failed topiramate due to side effects (paresthesias). Plan: For migraine preventative therapy-  - Continue magnesium oxide 400 mg daily and B2 (riboflavin) 400 mg daily  - Increase amitriptyline to 100 mg at bedtime  - If no improvement, consider injectable CGRP inhibitor for prevention  - Advised returning to sleep medicine to discuss ongoing issues with insomnia  - Advised patient to stay well-hydrated, eat regularly/not skip meals and try to get adequate sleep    For migraine abortive therapy-  - Continue naratriptan 2.5 mg at onset of migraine, may repeat x1 in 2 hours if needed    Patient will follow-up in 3-4 months or sooner if needed. She was advised to call the office for any new or worsening symptoms in the meantime. (Patient is requesting [facility] office as this office is too far for her to travel)     Diagnoses and all orders for this visit:    Abnormal MRI of head    Chronic migraine without aura without status migrainosus, not intractable  -     amitriptyline (ELAVIL) 100 mg tablet; Take 1 tablet (100 mg total) by mouth daily at bedtime           Subjective:    HPI      Patient is a 25year old female with PMH of migraines, asthma, who presents today for neurologic follow up. To review, patient initially presented to our office in March 2022 for a 2nd/3rd opinion regarding possible MS. She was evaluated by Dr. Gomez Ross. Patient reported history of headaches dating back to age 13. She was seen by Joc[facility] Villa, Dr. Tera Severino.   She had also more recently been following with Dr. Emily Levi at [facility] for headaches. She was having headaches and dizziness at time of presentation. She apparently had been hospitalized at age 12-13 for blurry vision that occurred during a migraine (but her vision is fine when not having migraine). LP was done and negative. Evoked potential testing was done at that time that did not show evidence that supported MS. More recent MRI brain reviewed at time of consult. MRI brain from Oct 2021 from [facility] no significant change noted compared to Jan 2013 and Oct 2006 studies. Per report stable, nonspecific deep right frontal and pericallosal and periventricular WM signal abnormality. 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 No enh with john. No mass effect. Main reason for imaging has been migraines. She has never formally been diagnosed with MS or on DMT. Imaging was updated at time of consult here. MRI c-spine and t-spine completed on 4/6/22 at [facility] with no intrinsic cord signal abnormality. She was found to have an abnormality on her thrombosis panel, PCP referred to hematology. 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 No AP/AC at this time, had been advised to contact them prior to pregnancy. Birth control methods were discussed and changes were made to be contraceptive method. Her oral BCP was stopped and had Nexplanon implanted. MRI brain more recently updated 1/28/23 at [facility]. Stable appearance of the T2/FLAIR hyperintensity in the right frontal subcortical and deep white matter. No new or progressive areas of signal abnormality. (comparisons made to 10/15/21, 10/12/06). She was last seen by Dr. Max Menjivar in March 2023. At that time, she was referred to [facility] for evaluation of migraines, was seen in May 2023.   Advised magnesium and B2 supplements, switched Imitrex to Amerge, continued on Amitriptyline. She had also been referred to sleep medicine to r/o sleep apnea as a cause of headaches. 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 Today, patient presents with her mother. Patient reports she is overall doing ok, except still having a lot of trouble sleeping. Last month, she contacted Ria Hernandez when she had an intractable migraine and Depakote taper helped abort it. He also increased her amitriptyline to 75mg HS, which helped a little, but still getting headaches. She still has trouble falling asleep and staying asleep. She has not been back to see the sleep specialist since her home sleep study. The sleep specialist wants her to have a sleep study in the hospital, and she has a lot of anxiety regarding staying in the hospital for this. She feels the naratriptan is more helpful than sumatriptan hand to abort her migraines. She is taking magnesium and B2 that Gerard suggested. She is still getting several migraines per month. She denies any new neurologic symptoms. No vision changes, speech or swallowing difficulty, bowel or bladder changes, weakness, falls. She says sometimes she wakes up at night and her knees are aching, has not had this evaluated by PCP. The following portions of the patient's history were reviewed and updated as appropriate: current medications, past family history, past medical history, past social history, past surgical history, and problem list.       Objective:    Blood pressure 120/76, pulse 85, temperature 97.5 °F (36.4 °C), temperature source Temporal, resp. rate 18, height 5' (1.524 m), weight 66.7 kg (147 lb), SpO2 98 %. Physical Exam  Constitutional:       Appearance: Normal appearance. HENT:      Head: Normocephalic and atraumatic. Eyes:      Extraocular Movements: EOM normal.      Pupils: Pupils are equal, round, and reactive to light.    Neurological: Mental Status: She is alert. Motor: Motor strength is normal.     Deep Tendon Reflexes: Reflexes are normal and symmetric. Psychiatric:         Mood and Affect: Mood normal.         Speech: Speech normal.         Behavior: Behavior normal.         Neurological Exam  Mental Status  Alert. Oriented to person, place, time and situation. Speech is normal. Language is fluent with no aphasia. Attention and concentration are normal.    Cranial Nerves  CN II: Visual fields full to confrontation. CN III, IV, VI: Extraocular movements intact bilaterally. Pupils equal round and reactive to light bilaterally. CN V: Facial sensation is normal.  CN VII: Full and symmetric facial movement. CN VIII: Hearing is normal.  CN IX, X: Palate elevates symmetrically  CN XI: Shoulder shrug strength is normal.  CN XII: Tongue midline without atrophy or fasciculations. Motor   Normal muscle tone. Strength is 5/5 throughout all four extremities. Sensory  Light touch is normal in upper and lower extremities. Reflexes  Deep tendon reflexes are 2+ and symmetric in all four extremities. Coordination  Right: Finger-to-nose normal.Left: Finger-to-nose normal.    Gait  Casual gait is normal including stance, stride, and arm swing. ROS:    Review of Systems   Constitutional:  Negative for chills and fever. HENT:  Negative for ear pain and sore throat. Eyes:  Negative for pain and visual disturbance. Respiratory:  Negative for cough and shortness of breath. Cardiovascular:  Negative for chest pain and palpitations. Gastrointestinal:  Positive for nausea. Negative for abdominal pain and vomiting. Genitourinary:  Negative for dysuria and hematuria. Musculoskeletal:  Positive for back pain (lower). Negative for arthralgias. Skin:  Negative for color change and rash. Neurological:  Positive for headaches (4 in the last week). Negative for seizures and syncope. Hematological:  Bruises/bleeds easily. Psychiatric/Behavioral:  Positive for sleep disturbance. The patient is nervous/anxious. Anxiety   All other systems reviewed and are negative.     I personally reviewed and updated the ROS as appropriate

## 2023-10-23 NOTE — ASSESSMENT & PLAN NOTE
Patient has been evaluated by several neurologists since early teen years for migraines and found to have abnormal MRI brain with white matter changes. Prior LP and VEP did not support MS diagnosis. Her imaging has been stable over many years. No lesions in the spinal cord. Aside from her migraines, no other neurologic issues, and no neurologic deficits lasting >24hrs. Most recent MRI brain 1/28/23 (completed at [facility]). Stable appearance of the T2/FLAIR hyperintensity in the right frontal subcortical and deep white matter. No new or progressive areas of signal abnormality. (comparisons made to 10/15/2021, 10/12/2006). MRI c-spine and t-spine 4/6/2022 (completed at [facility]) with normal cord signal.    Neurologic exam is unremarkable. Reviewed stable imaging for >15 years at this point. Discussed low suspicion for MS based on no progression of nonspecific white matter changes over all this time. Will keep under clinical and rads surveillance.

## 2023-10-23 NOTE — ASSESSMENT & PLAN NOTE
Patient had been referred to one of our headache team APs for evaluation of migraines. She is currently on amitriptyline 75mg HS for migraine prevention, just increased from 50mg HS last month due to increase in frequency of migraines. She is still having more frequent migraines. She did start mag and B2 supplements as suggested. Her abortive was changed from sumatriptan to naratriptan, and feels it works better. She has significant issues with sleep, likely playing a role in headaches. She was referred to sleep medicine (outside facility), had a home sleep study which showed potentially borderline PATEL, but an in lab study was recommended to better evaluate. Patient reports she has too much anxiety to do an in lab study (which she reports is done in the hospital at the facility she goes to). They also discussed some behavioral strategies for her insomnia. She has not been back to see sleep medicine and I encouraged her returning to discuss her ongoing sleep issues. We discussed increasing amitriptyline to 100 mg at bedtime. If this does not work for preventative therapy, we could certainly consider CGRP inhibitors such as Emgality, Jennifer Mons etc.  She previously tried and failed topiramate due to side effects (paresthesias). Plan:   For migraine preventative therapy-  - Continue magnesium oxide 400 mg daily and B2 (riboflavin) 400 mg daily  - Increase amitriptyline to 100 mg at bedtime  - If no improvement, consider injectable CGRP inhibitor for prevention  - Advised returning to sleep medicine to discuss ongoing issues with insomnia  - Advised patient to stay well-hydrated, eat regularly/not skip meals and try to get adequate sleep    For migraine abortive therapy-  - Continue naratriptan 2.5 mg at onset of migraine, may repeat x1 in 2 hours if needed

## 2023-10-23 NOTE — PATIENT INSTRUCTIONS
Continue magnesium and B2  Increase amitriptyline to 100mg at bedtime  Start vitamin D3 2,000IU daily  Would suggest returning to the sleep specialist  Follow up in 3-4 months or sooner if needed

## 2024-01-17 ENCOUNTER — TELEPHONE (OUTPATIENT)
Dept: NEUROLOGY | Facility: CLINIC | Age: 29
End: 2024-01-17

## 2024-01-17 DIAGNOSIS — G43.009 MIGRAINE WITHOUT AURA AND WITHOUT STATUS MIGRAINOSUS, NOT INTRACTABLE: ICD-10-CM

## 2024-01-17 RX ORDER — NARATRIPTAN 2.5 MG/1
2.5 TABLET ORAL AS NEEDED
Qty: 9 TABLET | Refills: 3 | Status: SHIPPED | OUTPATIENT
Start: 2024-01-17

## 2024-01-17 NOTE — TELEPHONE ENCOUNTER
This patient is in need of a medication refill /reorder of the following medications       naratriptan (AMERGE) 2.5 MG tablet      Patient has next appointment with Dr. Dexter 4/19/24       [facility]     [id number] - [place], [place] - [address] [place] [id number]  Phone: [phone]  Fax: [phone]

## 2024-01-18 NOTE — TELEPHONE ENCOUNTER
Received VM transcription from 1/17/24, 12:25 PM:    This message is for Dr. Gee. Patient's name is Althea Williamson. Date of birth, [birth year]. She needs a refill for the migraine headaches. The Naratriptan. If you could please call it to the pharmacist, I would really appreciate it. Thank you very much. Have a good day.  ----------------------    Already addressed. Script sent. Pt's mom notified and made aware.

## 2024-02-23 ENCOUNTER — TELEPHONE (OUTPATIENT)
Dept: NEUROLOGY | Facility: CLINIC | Age: 29
End: 2024-02-23

## 2024-02-23 DIAGNOSIS — G43.009 MIGRAINE WITHOUT AURA AND WITHOUT STATUS MIGRAINOSUS, NOT INTRACTABLE: ICD-10-CM

## 2024-02-23 DIAGNOSIS — G43.709 CHRONIC MIGRAINE WITHOUT AURA WITHOUT STATUS MIGRAINOSUS, NOT INTRACTABLE: ICD-10-CM

## 2024-02-23 NOTE — TELEPHONE ENCOUNTER
This patient is in need of a medication refill /reorder of the following medications     Patient stated they are switching jobs and may not have insurance for a while     Patient asking if the medication can be renewed with more refills just to make sure they have the medication until new insurance kicks in     naratriptan (AMERGE) 2.5 MG tablet     amitriptyline (ELAVIL) 100 mg tablet       [id number] - [place] - [address]  [address] [facility] [address]  Phone: [phone]  Fax: [phone]

## 2024-02-26 RX ORDER — AMITRIPTYLINE HYDROCHLORIDE 100 MG/1
100 TABLET ORAL
Qty: 90 TABLET | Refills: 1 | Status: SHIPPED | OUTPATIENT
Start: 2024-02-26

## 2024-02-26 RX ORDER — NARATRIPTAN 2.5 MG/1
2.5 TABLET ORAL AS NEEDED
Qty: 27 TABLET | Refills: 0 | Status: SHIPPED | OUTPATIENT
Start: 2024-02-26

## 2024-02-26 NOTE — TELEPHONE ENCOUNTER
She should has refills on her medications, therefore does not need anything sent now.  Gerard just sent the naratriptan 1 month ago with 3 additional refills.  Amitriptyline refills will last her until the end of April.  She can call for refills when she is due for refills, but they are not due now.  Having extra/extended refills on file will not matter regardless of insurance coverage

## 2024-02-26 NOTE — TELEPHONE ENCOUNTER
Spoke with pt's mom and advised her of additional refills available at pharmacy. Pt's mom says what she was asking for is if a script for a 90 day supply could be sent to the pharmacy for each of theses medications. Mom says she may not have explained it correctly the first time.    Laura - Are you agreeable to send script for 90 day supply for naratriptan and amitriptyline? Please advise. Thank you!

## 2024-04-02 ENCOUNTER — TELEPHONE (OUTPATIENT)
Dept: NEUROLOGY | Facility: CLINIC | Age: 29
End: 2024-04-02

## 2024-04-02 NOTE — TELEPHONE ENCOUNTER
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

## 2024-04-15 ENCOUNTER — TELEPHONE (OUTPATIENT)
Dept: NEUROLOGY | Facility: CLINIC | Age: 29
End: 2024-04-15

## 2024-04-19 ENCOUNTER — OFFICE VISIT (OUTPATIENT)
Dept: NEUROLOGY | Facility: CLINIC | Age: 29
End: 2024-04-19
Payer: COMMERCIAL

## 2024-04-19 VITALS
SYSTOLIC BLOOD PRESSURE: 114 MMHG | HEIGHT: 60 IN | BODY MASS INDEX: 30.82 KG/M2 | HEART RATE: 87 BPM | DIASTOLIC BLOOD PRESSURE: 72 MMHG | WEIGHT: 157 LBS | TEMPERATURE: 98.6 F

## 2024-04-19 DIAGNOSIS — R93.0 ABNORMAL FINDINGS ON DIAGNOSTIC IMAGING OF SKULL AND HEAD, NOT ELSEWHERE CLASSIFIED: ICD-10-CM

## 2024-04-19 DIAGNOSIS — R93.0 ABNORMAL MRI OF HEAD: ICD-10-CM

## 2024-04-19 DIAGNOSIS — G43.709 CHRONIC MIGRAINE WITHOUT AURA WITHOUT STATUS MIGRAINOSUS, NOT INTRACTABLE: ICD-10-CM

## 2024-04-19 DIAGNOSIS — G43.709 CHRONIC MIGRAINE WITHOUT AURA, NOT INTRACTABLE, WITHOUT STATUS MIGRAINOSUS: ICD-10-CM

## 2024-04-19 DIAGNOSIS — M25.50 ARTHRALGIA: Primary | ICD-10-CM

## 2024-04-19 PROCEDURE — 99214 OFFICE O/P EST MOD 30 MIN: CPT | Performed by: PSYCHIATRY & NEUROLOGY

## 2024-04-19 RX ORDER — PREDNISONE 10 MG/1
10 TABLET ORAL DAILY
COMMUNITY
Start: 2024-04-17
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
## 2024-04-19 NOTE — ASSESSMENT & PLAN NOTE
Pt here today with mom for neuro follow up  No new neuro sxs  No falls or trips  No recent hospitalizations  No recent infections  Exam stable   Rev most recent imaging from jan 2023 head stable comp to 2006  No new or focal neuro sxs  Pt with brief episode of tremor at work in nov.  Rev video  No evidence of resting or action tremor  Likely exageration of underlying physiological tremor exacerbatted with stress  Cont to follow clinically  Rtn in one for clinical surveillance

## 2024-04-19 NOTE — ASSESSMENT & PLAN NOTE
Pt notes overall improvement in headache freq and severity since seeing Gerard Ibarra  Pt is now on 100 mg elavil and tolerating med for preventative  Pt also on amerge as her abortive, works much better than imitrex for pt  Pt on vit b2 and mag supplement  Pt following with headache team for med management  Exam normal  Pt reminded if any future plans for pregnancy, will need to discontinue her meds prior to trying to concieve  No plans presently for pregnancy

## 2024-05-03 LAB
ANA SER QL: NEGATIVE
B BURGDOR IGG+IGM SER QL IA: NEGATIVE
RHEUMATOID FACT SERPL-ACNC: <10 IU/ML

## 2024-07-16 ENCOUNTER — TELEPHONE (OUTPATIENT)
Dept: NEUROLOGY | Facility: CLINIC | Age: 29
End: 2024-07-16

## 2024-07-16 NOTE — TELEPHONE ENCOUNTER
Called pt about rescheduling her appt on 8/2 due to the provider not being in the office. LMOM for pt to call us back to reschedule

## 2024-08-26 ENCOUNTER — TELEPHONE (OUTPATIENT)
Dept: NEUROLOGY | Facility: CLINIC | Age: 29
End: 2024-08-26

## 2024-08-26 NOTE — TELEPHONE ENCOUNTER
Called patient to confirm upcoming appointment on 9/4/24 with Gerard Ibarra in the [place] office.  LMOM

## 2024-09-23 ENCOUNTER — TELEPHONE (OUTPATIENT)
Dept: NEUROLOGY | Facility: CLINIC | Age: 29
End: 2024-09-23

## 2024-09-23 NOTE — TELEPHONE ENCOUNTER
Called patient to confirm upcoming appointment on 10/4/24  with Gerard Ibarra in the [place] office. LMOM

## 2024-10-04 ENCOUNTER — PATIENT MESSAGE (OUTPATIENT)
Dept: NEUROLOGY | Facility: CLINIC | Age: 29
End: 2024-10-04

## 2024-10-04 ENCOUNTER — OFFICE VISIT (OUTPATIENT)
Dept: NEUROLOGY | Facility: CLINIC | Age: 29
End: 2024-10-04
Payer: COMMERCIAL

## 2024-10-04 VITALS
SYSTOLIC BLOOD PRESSURE: 110 MMHG | BODY MASS INDEX: 30.73 KG/M2 | TEMPERATURE: 97.5 F | HEART RATE: 67 BPM | DIASTOLIC BLOOD PRESSURE: 70 MMHG | WEIGHT: 162.8 LBS | HEIGHT: 61 IN | OXYGEN SATURATION: 92 %

## 2024-10-04 DIAGNOSIS — G43.009 MIGRAINE WITHOUT AURA AND WITHOUT STATUS MIGRAINOSUS, NOT INTRACTABLE: ICD-10-CM

## 2024-10-04 DIAGNOSIS — G43.019 INTRACTABLE MIGRAINE WITHOUT AURA AND WITHOUT STATUS MIGRAINOSUS: Primary | ICD-10-CM

## 2024-10-04 PROCEDURE — 99213 OFFICE O/P EST LOW 20 MIN: CPT | Performed by: NURSE PRACTITIONER

## 2024-10-04 RX ORDER — TOPIRAMATE 25 MG/1
TABLET, FILM COATED ORAL
Qty: 90 TABLET | Refills: 5 | Status: SHIPPED | OUTPATIENT
Start: 2024-10-04

## 2024-10-04 RX ORDER — NARATRIPTAN 2.5 MG/1
2.5 TABLET ORAL AS NEEDED
Qty: 9 TABLET | Refills: 5 | Status: SHIPPED | OUTPATIENT
Start: 2024-10-04

## 2024-10-04 NOTE — PATIENT INSTRUCTIONS
Start topamax for prevention-let us know if side effects  Continue as needed amerge  Let us know if pregnancy plans change  Follow up in 6 months.

## 2024-10-04 NOTE — PROGRESS NOTES
Neurology Ambulatory Visit  Name: Althea Williamson       : 1995       MRN: [id number]   Encounter Provider: Smooth Awan MA   Encounter Date: 10/4/2024  Encounter department: [facility] [place]    Assessment and Plan  1. Migraine without aura and without status migrainosus, not intractable  -     naratriptan (AMERGE) 2.5 MG tablet; Take 1 tablet (2.5 mg total) by mouth as needed for migraine 2.5 mg at onset of headache, may repeat in 4 hours if needed  -     topiramate (Topamax) 25 mg tablet; Start with one pill nightly for 1 week and then increase to 2 pills nightly for one week and then take 3 pills at bedtime daily.    Patient Instructions/Plan:  Start topamax for prevention-let us know if side effects  Continue as needed amerge  Let us know if pregnancy plans change  Follow up in 6 months.    She will Return in about 6 months (around 2025).    History of Present Illness     HPI   Althea Williamson is a 29 y.o. female who presents with her mother for migraine follow up. She states she did stop her amitriptyline about 2 months ago because of weight gain and just this past month has had an increase in migraine headaches to 3x/week; no change in headache characteristics; her amerge does work but the headache just returns. Sleep is ok and mood is ok off the amitriptyline. She states she no longer has the nexplanon for birth control and she is no on any hormonal birth control; she is now engaged but is not planning pregnancy at this time and is actively preventing it. We discussed migraine preventative options including topiramate retrial and/or cgrp antagonists. Would avoid beta blockers with history of asthma and lower bp. She is agreeable to trial of topamax low dose and will let us know if side effects; could consider oral cgrp medication in the future if ineffective since it could be discontinued sooner before family planning compared to injectable option.    Lab Results  "  Component Value Date    ANTELMO Negative 05/02/2024     No results found for: \"RF\"  RF/Lyme neg        Previous History:  past medical history of migraine, sleep difficulty, asthma, and concern for demyelinating disease in past with recent stable white matter changes on MRI (continuing clinical and radiological surveillance). She presents today to discuss headaches, which started in 2006 (8th grade) The current headache frequency is 3x/week and usually lasts 1 day but the length of time is sporadic and can last up to 4 days. It is described as an occipital pressure that is associated with neck pain, nausea, photophobia, and occasionally vertigo; she denies vision changes. Stress and menstruation are triggers. She has tried topamax in the past (side effect numbness tingling). She currently is on amitriptyline which has been somewhat helpful. She has not tried vitamins; she does take a daily multivitamin. She states imitrex causes side effect and doesn't always work. She states >60oz/water/day, about 24oz coffee. She exercises in the gym 3x/week. She denies family history of cerebral aneurysm; she does state family history of migraine. 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     Review of Systems   Constitutional:  Positive for fatigue.   HENT: Negative.     Eyes: Negative.    Respiratory: Negative.     Cardiovascular: Negative.    Gastrointestinal:  Positive for nausea. Negative for diarrhea.   Endocrine: Negative.    Genitourinary: Negative.    Musculoskeletal: Negative.    Skin: Negative.    Allergic/Immunologic: Negative.    Neurological:  Positive for headaches.   Hematological: Negative.    Psychiatric/Behavioral: Negative.     Sensitivity to light and noise  ROS was reviewed and updated as appropriate    Current Outpatient Medications on File Prior to Visit   Medication Sig Dispense Refill    albuterol (2.5 mg/3 mL) 0.083 % nebulizer solution INHALE 3ML 3 TIMES DAILY BY NEBULIZATION ROUTE AS " "NEEDED      carisoprodol (SOMA) 350 mg tablet Take 350 mg by mouth as needed      Fluticasone-Salmeterol (Advair) 250-50 mcg/dose inhaler inhale 1 puff by mouth and INTO THE LUNGS twice a day      ibuprofen (MOTRIN) 600 mg tablet Take 600 mg by mouth every 6 (six) hours as needed      meclizine (ANTIVERT) 12.5 MG tablet Take 12.5 mg by mouth as needed      montelukast (SINGULAIR) 10 mg tablet       predniSONE 10 mg tablet Take 10 mg by mouth daily      [DISCONTINUED] amitriptyline (ELAVIL) 100 mg tablet Take 1 tablet (100 mg total) by mouth daily at bedtime 90 tablet 1    [DISCONTINUED] naratriptan (AMERGE) 2.5 MG tablet Take 1 tablet (2.5 mg total) by mouth as needed for migraine 2.5 mg at onset of headache, may repeat in 4 hours if needed 27 tablet 0    ipratropium (ATROVENT) 0.03 % nasal spray 2 sprays into each nostril      [DISCONTINUED] etonogestrel (NEXPLANON) subdermal implant Inject under the skin (Patient not taking: Reported on 4/19/2024)       No current facility-administered medications on file prior to visit.      Social History     Tobacco Use    Smoking status: Never    Smokeless tobacco: Never   Substance and Sexual Activity    Alcohol use: Yes     Comment: social    Drug use: Never    Sexual activity: Not on file       Objective     /70 (BP Location: Right arm, Patient Position: Sitting, Cuff Size: Standard)   Pulse 67   Temp 97.5 °F (36.4 °C) (Temporal)   Ht 5' 1\" (1.549 m)   Wt 73.8 kg (162 lb 12.8 oz)   SpO2 92%   BMI 30.76 kg/m²    Physical Exam  Vitals reviewed.   Constitutional:       General: She is not in acute distress.  HENT:      Head: Normocephalic and atraumatic.      Right Ear: External ear normal.      Left Ear: External ear normal.      Nose: Nose normal.      Mouth/Throat:      Mouth: Mucous membranes are moist.      Pharynx: Oropharynx is clear.   Eyes:      General: Lids are normal.         Right eye: No discharge.         Left eye: No discharge.      Extraocular " Movements: Extraocular movements intact.      Pupils: Pupils are equal, round, and reactive to light.   Cardiovascular:      Rate and Rhythm: Normal rate and regular rhythm.      Pulses: Normal pulses.      Heart sounds: Normal heart sounds.   Pulmonary:      Effort: Pulmonary effort is normal.      Breath sounds: Normal breath sounds.   Abdominal:      General: There is no distension.      Tenderness: There is no abdominal tenderness.   Musculoskeletal:      Cervical back: Normal range of motion.      Right lower leg: No edema.      Left lower leg: No edema.   Skin:     General: Skin is warm and dry.      Capillary Refill: Capillary refill takes less than 2 seconds.      Findings: No rash.   Neurological:      General: No focal deficit present.      Mental Status: Mental status is at baseline.      Motor: Motor strength is normal.     Coordination: Romberg sign negative.      Deep Tendon Reflexes:      Reflex Scores:       Brachioradialis reflexes are 2+ on the right side and 2+ on the left side.       Patellar reflexes are 2+ on the right side and 2+ on the left side.  Psychiatric:         Mood and Affect: Mood normal.         Speech: Speech normal.         Behavior: Behavior normal.       Neurological Exam  Mental Status  Awake, alert and oriented to person, place and time. Oriented to person, place and time. Speech is normal. Language is fluent with no aphasia.    Cranial Nerves  CN II: Visual acuity is normal. Visual fields full to confrontation.  CN III, IV, VI: Extraocular movements intact bilaterally. Normal lids and orbits bilaterally. Pupils equal round and reactive to light bilaterally.  CN V: Facial sensation is normal.  CN VII: Full and symmetric facial movement.  CN VIII: Hearing is normal.  CN IX, X: Palate elevates symmetrically. Normal gag reflex.  CN XI: Shoulder shrug strength is normal.  CN XII: Tongue midline without atrophy or fasciculations.    Motor  Normal muscle bulk throughout. Strength is  5/5 throughout all four extremities.    Sensory  Light touch is normal in upper and lower extremities.     Reflexes                                            Right                      Left  Brachioradialis                    2+                         2+  Patellar                                2+                         2+    Coordination  Right: Rapid alternating movement normal.Left: Rapid alternating movement normal.    Gait  Casual gait is normal including stance, stride, and arm swing. Romberg is absent. Able to rise from chair without using arms.

## 2024-10-10 RX ORDER — DIVALPROEX SODIUM 250 MG/1
TABLET, FILM COATED, EXTENDED RELEASE ORAL
Qty: 8 TABLET | Refills: 0 | Status: SHIPPED | OUTPATIENT
Start: 2024-10-10

## 2024-10-17 ENCOUNTER — TELEPHONE (OUTPATIENT)
Dept: NEUROLOGY | Facility: CLINIC | Age: 29
End: 2024-10-17

## 2024-10-17 NOTE — TELEPHONE ENCOUNTER
Received refills request for medication divalproex SOD, forms have been scanned into patients chart and routed to med refills

## 2025-04-13 LAB
ALBUMIN SERPL-MCNC: 4.6 G/DL (ref 3.9–4.9)
ALP SERPL-CCNC: 69 IU/L (ref 44–121)
ALT SERPL-CCNC: 11 IU/L (ref 0–32)
AST SERPL-CCNC: 13 IU/L (ref 0–40)
BASOPHILS # BLD AUTO: 0.1 X10E3/UL (ref 0–0.2)
BASOPHILS NFR BLD AUTO: 2 %
BILIRUB SERPL-MCNC: <0.2 MG/DL (ref 0–1.2)
BUN SERPL-MCNC: 13 MG/DL (ref 6–20)
BUN/CREAT SERPL: 19 (ref 9–23)
CALCIUM SERPL-MCNC: 10.7 MG/DL (ref 8.7–10.2)
CHLORIDE SERPL-SCNC: 105 MMOL/L (ref 96–106)
CO2 SERPL-SCNC: 20 MMOL/L (ref 20–29)
CREAT SERPL-MCNC: 0.68 MG/DL (ref 0.57–1)
EGFR: 119 ML/MIN/1.73
EOSINOPHIL # BLD AUTO: 0.1 X10E3/UL (ref 0–0.4)
EOSINOPHIL NFR BLD AUTO: 2 %
ERYTHROCYTE [DISTWIDTH] IN BLOOD BY AUTOMATED COUNT: 12.4 % (ref 11.7–15.4)
GLOBULIN SER-MCNC: 2.6 G/DL (ref 1.5–4.5)
GLUCOSE SERPL-MCNC: 97 MG/DL (ref 70–99)
HCT VFR BLD AUTO: 40.7 % (ref 34–46.6)
HGB BLD-MCNC: 13.3 G/DL (ref 11.1–15.9)
IMM GRANULOCYTES # BLD: 0 X10E3/UL (ref 0–0.1)
IMM GRANULOCYTES NFR BLD: 0 %
LYMPHOCYTES # BLD AUTO: 0.9 X10E3/UL (ref 0.7–3.1)
LYMPHOCYTES NFR BLD AUTO: 20 %
MCH RBC QN AUTO: 30.7 PG (ref 26.6–33)
MCHC RBC AUTO-ENTMCNC: 32.7 G/DL (ref 31.5–35.7)
MCV RBC AUTO: 94 FL (ref 79–97)
MONOCYTES # BLD AUTO: 0.4 X10E3/UL (ref 0.1–0.9)
MONOCYTES NFR BLD AUTO: 9 %
NEUTROPHILS # BLD AUTO: 3 X10E3/UL (ref 1.4–7)
NEUTROPHILS NFR BLD AUTO: 67 %
PLATELET # BLD AUTO: 371 X10E3/UL (ref 150–450)
POTASSIUM SERPL-SCNC: 4.7 MMOL/L (ref 3.5–5.2)
PROT SERPL-MCNC: 7.2 G/DL (ref 6–8.5)
RBC # BLD AUTO: 4.33 X10E6/UL (ref 3.77–5.28)
SODIUM SERPL-SCNC: 139 MMOL/L (ref 134–144)
WBC # BLD AUTO: 4.5 X10E3/UL (ref 3.4–10.8)

## 2025-04-14 ENCOUNTER — RESULTS FOLLOW-UP (OUTPATIENT)
Dept: NEUROLOGY | Facility: CLINIC | Age: 30
End: 2025-04-14

## 2025-07-21 ENCOUNTER — TELEPHONE (OUTPATIENT)
Dept: NEUROLOGY | Facility: CLINIC | Age: 30
End: 2025-07-21

## 2025-08-08 ENCOUNTER — OFFICE VISIT (OUTPATIENT)
Dept: NEUROLOGY | Facility: CLINIC | Age: 30
End: 2025-08-08
Payer: COMMERCIAL

## 2025-08-08 VITALS
HEIGHT: 61 IN | TEMPERATURE: 97.9 F | BODY MASS INDEX: 30.21 KG/M2 | DIASTOLIC BLOOD PRESSURE: 66 MMHG | WEIGHT: 160 LBS | HEART RATE: 65 BPM | SYSTOLIC BLOOD PRESSURE: 100 MMHG

## 2025-08-08 DIAGNOSIS — R93.0 ABNORMAL MRI OF HEAD: Primary | ICD-10-CM

## 2025-08-08 PROCEDURE — 99213 OFFICE O/P EST LOW 20 MIN: CPT | Performed by: PSYCHIATRY & NEUROLOGY

## 2025-08-08 RX ORDER — BUSPIRONE HYDROCHLORIDE 5 MG/1
1 TABLET ORAL 2 TIMES DAILY
COMMUNITY
Start: 2025-07-18